# Patient Record
Sex: MALE | Race: WHITE | NOT HISPANIC OR LATINO | Employment: OTHER | URBAN - NONMETROPOLITAN AREA
[De-identification: names, ages, dates, MRNs, and addresses within clinical notes are randomized per-mention and may not be internally consistent; named-entity substitution may affect disease eponyms.]

---

## 2020-08-20 ENCOUNTER — OFFICE VISIT (OUTPATIENT)
Dept: CARDIOLOGY | Facility: CLINIC | Age: 75
End: 2020-08-20

## 2020-08-20 VITALS
SYSTOLIC BLOOD PRESSURE: 130 MMHG | DIASTOLIC BLOOD PRESSURE: 68 MMHG | TEMPERATURE: 97.7 F | BODY MASS INDEX: 30.53 KG/M2 | HEIGHT: 66 IN | HEART RATE: 65 BPM | WEIGHT: 190 LBS

## 2020-08-20 DIAGNOSIS — R01.1 MURMUR, CARDIAC: ICD-10-CM

## 2020-08-20 DIAGNOSIS — E88.81 METABOLIC SYNDROME: ICD-10-CM

## 2020-08-20 DIAGNOSIS — I10 ESSENTIAL HYPERTENSION: Primary | ICD-10-CM

## 2020-08-20 DIAGNOSIS — E78.00 HYPERCHOLESTEREMIA: ICD-10-CM

## 2020-08-20 DIAGNOSIS — R00.2 PALPITATIONS: ICD-10-CM

## 2020-08-20 DIAGNOSIS — R60.0 BILATERAL LEG EDEMA: ICD-10-CM

## 2020-08-20 PROCEDURE — 93000 ELECTROCARDIOGRAM COMPLETE: CPT | Performed by: INTERNAL MEDICINE

## 2020-08-20 PROCEDURE — 99204 OFFICE O/P NEW MOD 45 MIN: CPT | Performed by: INTERNAL MEDICINE

## 2020-08-20 RX ORDER — LOSARTAN POTASSIUM 25 MG/1
25 TABLET ORAL DAILY
COMMUNITY

## 2020-08-20 RX ORDER — FUROSEMIDE 20 MG/1
10 TABLET ORAL DAILY PRN
COMMUNITY

## 2020-08-20 RX ORDER — HYDROCHLOROTHIAZIDE 25 MG/1
25 TABLET ORAL DAILY
COMMUNITY

## 2020-08-20 RX ORDER — TAMSULOSIN HYDROCHLORIDE 0.4 MG/1
1 CAPSULE ORAL DAILY
COMMUNITY

## 2020-08-20 RX ORDER — UBIDECARENONE 100 MG
100 CAPSULE ORAL DAILY
COMMUNITY

## 2020-08-20 NOTE — PROGRESS NOTES
Chief Complaint   Patient presents with   • Establish Care     Patient referred per PCP for HTN and Hyperlipidemia.   • Edema     Having swelling in legs for last 4-5 years, doing better with Losartan, HCTZ, and Lasix.   • Palpitations     Has occasional flutter, when he notices he tries to drink extra fluids. He is very active out in weather.   • Blood pressure     Has been stable for the last 7-8 years. In 2012 he reports 180/100 for a period of time.   • Aspirin     Patient does not take.        CARDIAC COMPLAINTS  fatigue, lower extremity edema and palpitations      Subjective   Ramona Phipps is a 75 y.o. male came in today for his initial cardiac evaluation.  He has history of hypertension which has been very well controlled for the last few years.  Recently he has been having problem with the blood pressure going up.  He used to be on Hyzaar in the past but later it was switched to hydrochlorothiazide separately along with a low-dose of Cozaar and the blood pressure started doing much better.  He also started noticing bilateral leg edema which did get better with the use of Lasix but unfortunately he is not able to sleep on those days because he has to get up multiple times to urinate.  He is now referred for evaluation of the blood pressure and also his lab work which was done recently showed his cholesterol was elevated with the LDL of 106.  He also had few episodes of epigastric chest pain which did get better after few days.  He did undergo her lab work to check his lipase which was normal.  He also started noticing palpitation in the form of fluttering of the heart.  It happens once or twice a week.  It last for few minutes to few hours.  He does get better by increasing his fluid intake.  He also has history of smoking.  He is smoked about 1 and half packs a day for 20 years.  He did quit smoking in 2001.  He also drinks about 2 shots of liquor a week.  Diabetes do run in the family and his lab work  showed blood sugar of 107 but the A1c was normal at 5.4.    No past surgical history on file.    Current Outpatient Medications   Medication Sig Dispense Refill   • B Complex Vitamins (VITAMIN B COMPLEX PO) Take  by mouth Daily.     • Cholecalciferol (VITAMIN D3 PO) Take  by mouth Daily.     • coenzyme Q10 100 MG capsule Take 100 mg by mouth Daily.     • furosemide (LASIX) 20 MG tablet Take 10 mg by mouth Daily As Needed.     • Glucosamine-Chondroit-Vit C-Mn (GLUCOSAMINE-CHONDROITIN MAX ST PO) Take  by mouth Daily.     • hydroCHLOROthiazide (HYDRODIURIL) 25 MG tablet Take 25 mg by mouth Daily.     • losartan (COZAAR) 25 MG tablet Take 25 mg by mouth Daily.     • magnesium oxide (MAGOX) 400 (241.3 Mg) MG tablet tablet Take 400 mg by mouth Daily.     • Multiple Vitamins-Minerals (MULTI FOR HIM 50+ PO) Take  by mouth Daily.     • Naproxen-Esomeprazole (Vimovo) 500-20 MG tablet delayed-release Take  by mouth Daily.     • Omega-3 Fatty Acids (FISH OIL) 1200 MG capsule delayed-release Take  by mouth Daily.     • Plant Sterols and Stanols (CHOLESTOFF PO) Take  by mouth Daily.     • tamsulosin (FLOMAX) 0.4 MG capsule 24 hr capsule Take 1 capsule by mouth Daily.       No current facility-administered medications for this visit.            ALLERGIES:  Patient has no known allergies.    Past Medical History:   Diagnosis Date   • Arthritis    • Heart murmur    • History of kidney stones    • History of right hip replacement    • Hx of arthroscopic knee surgery     right and left   • Hx of shoulder surgery     x3   • Hyperlipidemia    • Hypertension    • Scoliosis        Social History     Tobacco Use   Smoking Status Former Smoker   • Packs/day: 1.50   • Years: 20.00   • Pack years: 30.00   • Types: Cigarettes   • Last attempt to quit:    • Years since quittin.6   Smokeless Tobacco Never Used          Family History   Problem Relation Age of Onset   • Cancer Mother    • Diabetes Mother    • Cancer Father    • Diabetes  "Sister    • Parkinsonism Sister    • Heart attack Paternal Grandfather 61   • Diabetes Sister    • No Known Problems Son        Review of Systems   Constitution: Negative for decreased appetite and malaise/fatigue.   HENT: Negative for congestion and sore throat.    Eyes: Negative for blurred vision.   Cardiovascular: Positive for leg swelling and palpitations. Negative for chest pain.   Respiratory: Negative for shortness of breath and snoring.    Endocrine: Negative for cold intolerance and heat intolerance.   Hematologic/Lymphatic: Negative for adenopathy. Does not bruise/bleed easily.   Skin: Negative for itching, nail changes and skin cancer.   Musculoskeletal: Negative for arthritis and myalgias.   Gastrointestinal: Negative for abdominal pain, dysphagia and heartburn.   Genitourinary: Negative for bladder incontinence and frequency.   Neurological: Negative for dizziness, light-headedness, seizures and vertigo.   Psychiatric/Behavioral: Negative for altered mental status.   Allergic/Immunologic: Negative for environmental allergies and hives.       Diabetes- No  Thyroid- normal    Objective     /68 (BP Location: Left arm)   Pulse 65   Temp 97.7 °F (36.5 °C)   Ht 167.6 cm (66\")   Wt 86.2 kg (190 lb)   BMI 30.67 kg/m²     Physical Exam   Constitutional: He is oriented to person, place, and time. He appears well-developed and well-nourished.   HENT:   Head: Normocephalic.   Nose: Nose normal.   Eyes: Pupils are equal, round, and reactive to light. EOM are normal.   Neck: Normal range of motion. Neck supple.   Cardiovascular: Normal rate, regular rhythm, S1 normal and S2 normal.   Murmur heard.  Pulmonary/Chest: Effort normal and breath sounds normal.   Abdominal: Soft. Bowel sounds are normal.   Musculoskeletal: Normal range of motion. He exhibits edema.   Neurological: He is alert and oriented to person, place, and time.   Skin: Skin is warm and dry.   Psychiatric: He has a normal mood and affect.    "         ECG 12 Lead  Date/Time: 8/20/2020 4:57 PM  Performed by: Gabriela Sapp MD  Authorized by: Gabriela Sapp MD   Previous ECG: no previous ECG available  Rhythm: sinus rhythm  Ectopy: atrial premature contractions  Rate: normal  QRS axis: normal    Clinical impression: non-specific ECG              Assessment/Plan   Patient's Body mass index is 30.67 kg/m². BMI is above normal parameters. Recommendations include: educational material, exercise counseling and nutrition counseling.     Ramona was seen today for establish care, edema, palpitations, blood pressure and aspirin.    Diagnoses and all orders for this visit:    Essential hypertension    Hypercholesteremia    Palpitations  -     Adult Transthoracic Echo Complete W/ Cont if Necessary Per Protocol; Future    Metabolic syndrome    Bilateral leg edema  -     Adult Transthoracic Echo Complete W/ Cont if Necessary Per Protocol; Future    Murmur, cardiac  -     Adult Transthoracic Echo Complete W/ Cont if Necessary Per Protocol; Future       At baseline his heart rate is stable.  His blood pressure is normal.  His EKG which was done today showed sinus rhythm with occasional PAC's.  His clinical examination reveals a BMI of 31.  He does have short systolic murmur at the mitral area.  He also has trace to 1+ pedal edema.    Regarding his blood pressure, he seems to be very well controlled with the Cozaar and hydrochlorothiazide.  Continue the same.  He does use Lasix occasionally.  I advised him to take it mostly in the morning rather than in the evening.    Regarding his hypercholesterolemia, it is borderline elevated.  He does have significant metabolic syndrome also.  I had a long talk with him about the diet and gave him papers on Mediterranean diet.  He is advised to recheck his lipids after being on the strict diet for at least 6 months.  If the LDL still continues to be elevated, then he may need to be on a statin but otherwise we can try  medical management    Regarding the palpitation, it appears to be occurring only occasionally.  He notices the palpitation more when the is dehydrated.  I encouraged him to increase his fluid intake.  I did schedule him to undergo an echocardiogram to evaluate the LV function, valvular structures as well as the PA pressure.    Regarding the leg edema, it could be venous insufficiency.  If the echocardiogram showed normal PA pressure and venous edema persist then he may need to undergo venous ultrasound to rule out venous insufficiency.    Regarding the cardiac murmur, it appears to be secondary to mitral regurgitation.  We will get the echocardiogram to evaluate the valvular structures.    Overall his cardiac status appears stable.  I will see him back in 6 months or sooner if needed.             Electronically signed by Gabriela Sapp MD August 20, 2020 16:00

## 2020-09-14 ENCOUNTER — APPOINTMENT (OUTPATIENT)
Dept: CARDIOLOGY | Facility: HOSPITAL | Age: 75
End: 2020-09-14

## 2020-09-14 ENCOUNTER — TELEPHONE (OUTPATIENT)
Dept: CARDIOLOGY | Facility: CLINIC | Age: 75
End: 2020-09-14

## 2020-09-14 NOTE — TELEPHONE ENCOUNTER
"Pt called, he had an appointment for an echo this morning at 8:00 AM at the [facility] here in [place]. Pt was very upset.  Reports there being a \"waiting room full of people\".  Concerned due to Covid and not being able to social distance due to so many people being in the waiting room. \" I do not feel like the mask works, I really feel like you need to social distance\".  Pt was under the impression that with the diagnostic center being a [facility] facility that it would be the same rules for social distancing in the waiting room as we have here in our office. He was asking to be sent somewhere else for his testing. After discussing at length with patient, he would like to use the diagnostic center here in [place] if there is a way to do follow the social distancing recommendation. Advised him I will discuss with the [facility] and if that cannot be obtained we will gladly send him to another facility.     I have called the diagnostic center but have not been able to speak to anyone yet. Advised patient I would like to speak to Do and I feel like she will fix the situation, whether it be letting the him wait in the car till his appointment or scheduling him at a late appointment when they are not so busy. Do is out due to a death in the family. I have updated pt and let him know she is out and that I would keep him updated after I talk to her and if he is still not satisfied that we would gladly send to another facility for testing. Pt very accepting of the plan.  "

## 2020-09-17 NOTE — TELEPHONE ENCOUNTER
Spoke with patient to follow up. He is scheduled for echo tomorrow at [facility] here in [place].  He said Do called him, they discussed options, he feels completely comfortable with having the tests completed there.

## 2020-09-17 NOTE — TELEPHONE ENCOUNTER
[facility] is going to call patient and discuss options.

## 2020-09-18 ENCOUNTER — HOSPITAL ENCOUNTER (OUTPATIENT)
Dept: CARDIOLOGY | Facility: HOSPITAL | Age: 75
Discharge: HOME | End: 2020-09-18
Admitting: INTERNAL MEDICINE

## 2020-09-18 DIAGNOSIS — R60.0 BILATERAL LEG EDEMA: ICD-10-CM

## 2020-09-18 DIAGNOSIS — R00.2 PALPITATIONS: ICD-10-CM

## 2020-09-18 DIAGNOSIS — R01.1 MURMUR, CARDIAC: ICD-10-CM

## 2020-09-18 LAB
BH CV ECHO MEAS - ACS: 2.2 CM
BH CV ECHO MEAS - AO MAX PG: 9.7 MMHG
BH CV ECHO MEAS - AO MEAN PG: 5 MMHG
BH CV ECHO MEAS - AO ROOT AREA (BSA CORRECTED): 1.7
BH CV ECHO MEAS - AO ROOT AREA: 8.6 CM^2
BH CV ECHO MEAS - AO ROOT DIAM: 3.3 CM
BH CV ECHO MEAS - AO V2 MAX: 156 CM/SEC
BH CV ECHO MEAS - AO V2 MEAN: 100 CM/SEC
BH CV ECHO MEAS - AO V2 VTI: 30.4 CM
BH CV ECHO MEAS - BSA(HAYCOCK): 2 M^2
BH CV ECHO MEAS - BSA: 2 M^2
BH CV ECHO MEAS - BZI_BMI: 30.7 KILOGRAMS/M^2
BH CV ECHO MEAS - BZI_METRIC_HEIGHT: 167.6 CM
BH CV ECHO MEAS - BZI_METRIC_WEIGHT: 86.2 KG
BH CV ECHO MEAS - EDV(CUBED): 72.5 ML
BH CV ECHO MEAS - EDV(MOD-SP4): 92.9 ML
BH CV ECHO MEAS - EDV(TEICH): 77.3 ML
BH CV ECHO MEAS - EF(CUBED): 71.3 %
BH CV ECHO MEAS - EF(MOD-SP4): 56.1 %
BH CV ECHO MEAS - EF(TEICH): 63.4 %
BH CV ECHO MEAS - ESV(CUBED): 20.8 ML
BH CV ECHO MEAS - ESV(MOD-SP4): 40.8 ML
BH CV ECHO MEAS - ESV(TEICH): 28.3 ML
BH CV ECHO MEAS - FS: 34.1 %
BH CV ECHO MEAS - IVS/LVPW: 1.1
BH CV ECHO MEAS - IVSD: 1.2 CM
BH CV ECHO MEAS - LA DIMENSION: 3.6 CM
BH CV ECHO MEAS - LA/AO: 1.1
BH CV ECHO MEAS - LV DIASTOLIC VOL/BSA (35-75): 47.5 ML/M^2
BH CV ECHO MEAS - LV IVRT: 0.1 SEC
BH CV ECHO MEAS - LV MASS(C)D: 161.5 GRAMS
BH CV ECHO MEAS - LV MASS(C)DI: 82.5 GRAMS/M^2
BH CV ECHO MEAS - LV SYSTOLIC VOL/BSA (12-30): 20.8 ML/M^2
BH CV ECHO MEAS - LVIDD: 4.2 CM
BH CV ECHO MEAS - LVIDS: 2.8 CM
BH CV ECHO MEAS - LVLD AP4: 7.7 CM
BH CV ECHO MEAS - LVLS AP4: 6.1 CM
BH CV ECHO MEAS - LVOT AREA (M): 3.1 CM^2
BH CV ECHO MEAS - LVOT AREA: 3.1 CM^2
BH CV ECHO MEAS - LVOT DIAM: 2 CM
BH CV ECHO MEAS - LVPWD: 1.1 CM
BH CV ECHO MEAS - MV A MAX VEL: 103 CM/SEC
BH CV ECHO MEAS - MV DEC SLOPE: 322 CM/SEC^2
BH CV ECHO MEAS - MV E MAX VEL: 72.7 CM/SEC
BH CV ECHO MEAS - MV E/A: 0.71
BH CV ECHO MEAS - RAP SYSTOLE: 10 MMHG
BH CV ECHO MEAS - RVDD: 2.9 CM
BH CV ECHO MEAS - RVSP: 36 MMHG
BH CV ECHO MEAS - SI(AO): 132.9 ML/M^2
BH CV ECHO MEAS - SI(CUBED): 26.4 ML/M^2
BH CV ECHO MEAS - SI(MOD-SP4): 26.6 ML/M^2
BH CV ECHO MEAS - SI(TEICH): 25 ML/M^2
BH CV ECHO MEAS - SV(AO): 260 ML
BH CV ECHO MEAS - SV(CUBED): 51.7 ML
BH CV ECHO MEAS - SV(MOD-SP4): 52.1 ML
BH CV ECHO MEAS - SV(TEICH): 49 ML
BH CV ECHO MEAS - TR MAX VEL: 255 CM/SEC
MAXIMAL PREDICTED HEART RATE: 146 BPM
STRESS TARGET HR: 124 BPM

## 2020-09-18 PROCEDURE — 93306 TTE W/DOPPLER COMPLETE: CPT | Performed by: INTERNAL MEDICINE

## 2020-09-18 PROCEDURE — 93306 TTE W/DOPPLER COMPLETE: CPT

## 2020-10-30 ENCOUNTER — OFFICE VISIT (OUTPATIENT)
Dept: SURGERY | Facility: CLINIC | Age: 75
End: 2020-10-30

## 2020-10-30 VITALS
SYSTOLIC BLOOD PRESSURE: 122 MMHG | TEMPERATURE: 98 F | DIASTOLIC BLOOD PRESSURE: 77 MMHG | WEIGHT: 185 LBS | BODY MASS INDEX: 29.73 KG/M2 | HEIGHT: 66 IN | HEART RATE: 60 BPM

## 2020-10-30 DIAGNOSIS — R10.31 INGUINODYNIA, RIGHT: Primary | ICD-10-CM

## 2020-10-30 PROCEDURE — 99204 OFFICE O/P NEW MOD 45 MIN: CPT | Performed by: SURGERY

## 2020-10-30 NOTE — PROGRESS NOTES
Date of Service: 10/30/2020    Subjective   Ramona Phipps is a 75 y.o. male is being seen for consultation for Right inguinal pain and bulge today at the request of Mildred Dickson APRN    Chief complaint: Right inguinal pain and bulge    Ramona Phipps is a 75 y.o. male who presents to my clinic for right inguinal pain and bulge that he first noticed earlier this month after golfing. Started as a burning right groin pain with an associated bulge that is reducible.Brought about by exertion or straining.  Bulge has never extended down into scrotum. Denies obstructive symptoms or changes in bowel habits.   He has purchased a hernia belt that has helped.     Has a history of left nephrolithiasis requiring lithotripsy, complicated by staph epidermidis bacteremia at [facility] which has made him reluctant to seek treatment there.     NKDA  Intolerance to certain beers  Extreme dislike of ciprofloxacin    Past Medical History:   Diagnosis Date   • Arthritis    • Heart murmur    • History of kidney stones    • History of right hip replacement    • Hx of arthroscopic knee surgery     right and left   • Hx of shoulder surgery     x3   • Hyperlipidemia    • Hypertension    • Scoliosis        Past Surgical History:   Procedure Laterality Date   • ECHO - CONVERTED  09/18/2020    EF 60%. Trace-Mild MR. Trace AI. RVSP- 36 mmHg.   • HIP SURGERY      replacement and tendon stretched   • KNEE ARTHROSCOPY Bilateral    • LEG SURGERY     • SHOULDER SURGERY Bilateral     bone spurs, rotator cuff x2     No abdominal surgeries    Family History   Problem Relation Age of Onset   • Cancer Mother    • Diabetes Mother    • Cancer Father    • Diabetes Sister    • Parkinsonism Sister    • Heart attack Paternal Grandfather 61   • Diabetes Sister    • No Known Problems Son          Social History     Socioeconomic History   • Marital status:      Spouse name: Not on file   • Number of children: Not on file   • Years of education: Not on file   •  "Highest education level: Not on file   Tobacco Use   • Smoking status: Former Smoker     Packs/day: 1.50     Years: 20.00     Pack years: 30.00     Types: Cigarettes     Quit date:      Years since quittin.8   • Smokeless tobacco: Never Used   Substance and Sexual Activity   • Alcohol use: Yes     Alcohol/week: 2.0 standard drinks     Types: 2 Shots of liquor per week     Comment: occasional wine   • Drug use: Never   • Sexual activity: Defer     Quit smoking cigars several weeks ago.           Review of Systems        Constitutional: No fevers, chills or malaise. No unintentional weight loss   Eyes: Denies visual changes    Cardiovascular: Denies chest pain, palpitations   Respiratory: Denies cough or shortness of breath   Abdominal/Gastrointestinal: See HPI    Genitourinary: Denies dysuria or hematuria   Musculoskeletal: Chronic joint pains present.              Skin: No lesions or rashes   Psychiatric: No recent mood changes   Neurologic: No paresthesias or loss of function        Objective     Physical Exam:      10/30/20  1538   Weight: 83.9 kg (185 lb)    Body mass index is 29.86 kg/m².  Constitution: /77   Pulse 60   Temp 98 °F (36.7 °C)   Ht 167.6 cm (66\")   Wt 83.9 kg (185 lb)   BMI 29.86 kg/m²  . No acute distress  Head: Normocephalic, atraumatic.   Eyes: Aligned without strabismus. Conjunctiva noninjected   Ears, Nose, Mouth:  No lesions appreciated   CV: Rhythm  and rate regular   Respiratory: Symmetric chest expansion. No respiratory distress.   Gastrointestinal:  Soft, nondistended, nontender   No overt inguinal hernia bilaterally  Skin:  No cyanosis, clubbing or edema bilaterally    Lymphatics: No abnormal inguinal adenopathy  Neurologic: No gross deficits   Psychiatric: alert and oriented x3         Results:  I have reviewed the patient's ECHO from September with  EF of 56-60%. Trace aortic valve regurgitation and trace mitral regurgitation.      Assessment     Ramona Phipps is a " 75 y.o. male with Right inguinodynia and reported bulge, no appreciated on exam.     Patient's signs and symptoms are consistent with right inguinal hernia but a defect or bulge was not appreciated on exam.   We will obtain a CT abd/pelvis without contrast to determine presence of hernia and for operative planning. I will call the patient with the results    Given the choice, the patient would prefer laparoscopic/robotic approach.  We discussed the risks and benefits of open vs robotic assisted laparoscopic repair, both having equivalent overall success rates.            Jv العراقي MD  [facility]

## 2020-11-03 ENCOUNTER — TELEPHONE (OUTPATIENT)
Dept: SURGERY | Facility: CLINIC | Age: 75
End: 2020-11-03

## 2020-11-03 NOTE — TELEPHONE ENCOUNTER
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 He does have out of network benefits in his coverage. I wanted to make sure this was ok and if I can go ahead and schedule the CT Scan. Medicare is primary and pays 80% of the costs still. There was no answer so I left a message to call me back.     No Auth req but will have to use out of network benefits.   Call Ref: [id number]

## 2020-11-04 ENCOUNTER — TELEPHONE (OUTPATIENT)
Dept: SURGERY | Facility: CLINIC | Age: 75
End: 2020-11-04

## 2020-11-04 NOTE — TELEPHONE ENCOUNTER
PATIENT WILL TALK WITH HIS PCP TO SEE IF HE CAN GET CT DONE THROUGH THEM SO HIS INSURANCE WILL PAY FOR IT.

## 2021-01-01 NOTE — PATIENT INSTRUCTIONS
Mediterranean Diet  A Mediterranean diet refers to food and lifestyle choices that are based on the traditions of countries located on the [place]. This way of eating has been shown to help prevent certain conditions and improve outcomes for people who have chronic diseases, like kidney disease and heart disease.  What are tips for following this plan?  Lifestyle  · Cook and eat meals together with your family, when possible.  · Drink enough fluid to keep your urine clear or pale yellow.  · Be physically active every day. This includes:  ? Aerobic exercise like running or swimming.  ? Leisure activities like gardening, walking, or housework.  · Get 7-8 hours of sleep each night.  · If recommended by your health care provider, drink red wine in moderation. This means 1 glass a day for nonpregnant women and 2 glasses a day for men. A glass of wine equals 5 oz (150 mL).  Reading food labels    · Check the serving size of packaged foods. For foods such as rice and pasta, the serving size refers to the amount of cooked product, not dry.  · Check the total fat in packaged foods. Avoid foods that have saturated fat or trans fats.  · Check the ingredients list for added sugars, such as corn syrup.  Shopping  · At the grocery store, buy most of your food from the areas near the walls of the store. This includes:  ? Fresh fruits and vegetables (produce).  ? Grains, beans, nuts, and seeds. Some of these may be available in unpackaged forms or large amounts (in bulk).  ? Fresh seafood.  ? Poultry and eggs.  ? Low-fat dairy products.  · Buy whole ingredients instead of prepackaged foods.  · Buy fresh fruits and vegetables in-season from local farmers markets.  · Buy frozen fruits and vegetables in resealable bags.  · If you do not have access to quality fresh seafood, buy precooked frozen shrimp or canned fish, such as tuna, salmon, or sardines.  · Buy small amounts of raw or cooked vegetables, salads, or olives from  the deli or salad bar at your store.  · Stock your pantry so you always have certain foods on hand, such as olive oil, canned tuna, canned tomatoes, rice, pasta, and beans.  Cooking  · Cook foods with extra-virgin olive oil instead of using butter or other vegetable oils.  · Have meat as a side dish, and have vegetables or grains as your main dish. This means having meat in small portions or adding small amounts of meat to foods like pasta or stew.  · Use beans or vegetables instead of meat in common dishes like chili or lasagna.  · [place] with different cooking methods. Try roasting or broiling vegetables instead of steaming or sautéeing them.  · Add frozen vegetables to soups, stews, pasta, or rice.  · Add nuts or seeds for added healthy fat at each meal. You can add these to yogurt, salads, or vegetable dishes.  · Marinate fish or vegetables using olive oil, lemon juice, garlic, and fresh herbs.  Meal planning    · Plan to eat 1 vegetarian meal one day each week. Try to work up to 2 vegetarian meals, if possible.  · Eat seafood 2 or more times a week.  · Have healthy snacks readily available, such as:  ? Vegetable sticks with hummus.  ? Greek yogurt.  ? Fruit and nut trail mix.  · Eat balanced meals throughout the week. This includes:  ? Fruit: 2-3 servings a day  ? Vegetables: 4-5 servings a day  ? Low-fat dairy: 2 servings a day  ? Fish, poultry, or lean meat: 1 serving a day  ? Beans and legumes: 2 or more servings a week  ? Nuts and seeds: 1-2 servings a day  ? Whole grains: 6-8 servings a day  ? Extra-virgin olive oil: 3-4 servings a day  · Limit red meat and sweets to only a few servings a month  What are my food choices?  · Mediterranean diet  ? Recommended  § Grains: Whole-grain pasta. Brown rice. Bulgar wheat. Polenta. Couscous. Whole-wheat bread. Oatmeal. Quinoa.  § Vegetables: Artichokes. Beets. Broccoli. Cabbage. Carrots. Eggplant. Green beans. Chard. Kale. Spinach. Onions. Leeks. Peas. Squash.  Tomatoes. Peppers. Radishes.  § Fruits: Apples. Apricots. Avocado. Berries. Bananas. Cherries. Dates. Figs. Grapes. [place]. Melon. Oranges. Peaches. Plums. Pomegranate.  § Meats and other protein foods: Beans. Almonds. Sunflower seeds. Pine nuts. Peanuts. Cod. [place]. Scallops. Shrimp. Tuna. Tilapia. Clams. Oysters. Eggs.  § Dairy: Low-fat milk. Cheese. Greek yogurt.  § Beverages: Water. Red wine. Herbal tea.  § Fats and oils: Extra virgin olive oil. Avocado oil. Grape seed oil.  § Sweets and desserts: Greek yogurt with honey. Baked apples. Poached pears. Trail mix.  § Seasoning and other foods: Basil. Cilantro. Coriander. Cumin. Mint. Parsley. Prashanth. Rosemary. Tarragon. Garlic. Oregano. Thyme. Pepper. Balsalmic vinegar. Tahini. Hummus. Tomato sauce. Olives. Mushrooms.  ? Limit these  § Grains: Prepackaged pasta or rice dishes. Prepackaged cereal with added sugar.  § Vegetables: Deep fried potatoes (french fries).  § Fruits: Fruit canned in syrup.  § Meats and other protein foods: Beef. Pork. Lamb. Poultry with skin. Hot dogs. Senior.  § Dairy: Ice cream. Sour cream. Whole milk.  § Beverages: Juice. Sugar-sweetened soft drinks. Beer. Liquor and spirits.  § Fats and oils: Butter. Canola oil. Vegetable oil. Beef fat (tallow). Lard.  § Sweets and desserts: Cookies. Cakes. Pies. Candy.  § Seasoning and other foods: Mayonnaise. Premade sauces and marinades.  The items listed may not be a complete list. Talk with your dietitian about what dietary choices are right for you.  Summary  · The Mediterranean diet includes both food and lifestyle choices.  · Eat a variety of fresh fruits and vegetables, beans, nuts, seeds, and whole grains.  · Limit the amount of red meat and sweets that you eat.  · Talk with your health care provider about whether it is safe for you to drink red wine in moderation. This means 1 glass a day for nonpregnant women and 2 glasses a day for men. A glass of wine equals 5 oz (150 mL).  This information  is not intended to replace advice given to you by your health care provider. Make sure you discuss any questions you have with your health care provider.  Document Released: 08/10/2017 Document Revised: 08/17/2017 Document Reviewed: 08/10/2017  Elsevier Patient Education © 2020 [employer]     n/a

## 2021-02-23 ENCOUNTER — OFFICE VISIT (OUTPATIENT)
Dept: CARDIOLOGY | Facility: CLINIC | Age: 76
End: 2021-02-23

## 2021-02-23 VITALS
BODY MASS INDEX: 32.14 KG/M2 | HEIGHT: 66 IN | TEMPERATURE: 98.4 F | HEART RATE: 72 BPM | WEIGHT: 200 LBS | SYSTOLIC BLOOD PRESSURE: 120 MMHG | DIASTOLIC BLOOD PRESSURE: 80 MMHG

## 2021-02-23 DIAGNOSIS — R01.1 MURMUR, CARDIAC: ICD-10-CM

## 2021-02-23 DIAGNOSIS — I10 ESSENTIAL HYPERTENSION: Primary | ICD-10-CM

## 2021-02-23 DIAGNOSIS — E88.81 METABOLIC SYNDROME: ICD-10-CM

## 2021-02-23 DIAGNOSIS — E78.00 HYPERCHOLESTEREMIA: ICD-10-CM

## 2021-02-23 DIAGNOSIS — R60.0 BILATERAL LEG EDEMA: ICD-10-CM

## 2021-02-23 PROCEDURE — 99213 OFFICE O/P EST LOW 20 MIN: CPT | Performed by: INTERNAL MEDICINE

## 2021-02-23 RX ORDER — ASCORBIC ACID 500 MG
500 TABLET ORAL DAILY
COMMUNITY

## 2021-02-23 NOTE — PROGRESS NOTES
Chief Complaint   Patient presents with   • Follow-up     for cardiac management   • Med Refill     PCP writes all refills.    • Labs     scheduled for March 4th for labs, will have PCP send results   • Procedure     hernia repair in Dec   • Aspirin     does not take a daily aspirin       CARDIAC COMPLAINTS  Cardiac Management        Subjective   Ramona Phipps is a 76 y.o. male came in today for his regular follow-up visit.  He has history of hypertension has been having little difficulty in getting the blood pressure control.  He also has been noticing some increasing shortness of breath and leg edema.  He did undergo an echocardiogram which showed the overall LV function was normal.  He did have some trace valvular regurgitation and his PA pressure was only mildly elevated.  At this time we continued his low-dose of hydrochlorothiazide.  He came today stating that he is doing much better now his blood pressure is doing better with the medication and uses Lasix only as needed.He was having a lot of problem with hernia and underwent hernia repair in December.  He is not taking any aspirin at this time.              Cardiac History  Past Surgical History:   Procedure Laterality Date   • ECHO - CONVERTED  09/18/2020    EF 60%. Trace-Mild MR. Trace AI. RVSP- 36 mmHg.   • HIP SURGERY      replacement and tendon stretched   • KNEE ARTHROSCOPY Bilateral    • LEG SURGERY     • SHOULDER SURGERY Bilateral     bone spurs, rotator cuff x2       Current Outpatient Medications   Medication Sig Dispense Refill   • ascorbic acid (VITAMIN C) 500 MG tablet Take 500 mg by mouth Daily.     • B Complex Vitamins (VITAMIN B COMPLEX PO) Take  by mouth Daily.     • Cholecalciferol (VITAMIN D3 PO) Take  by mouth Daily.     • coenzyme Q10 100 MG capsule Take 100 mg by mouth Daily.     • furosemide (LASIX) 20 MG tablet Take 10 mg by mouth Daily As Needed.     • Glucosamine-Chondroit-Vit C-Mn (GLUCOSAMINE-CHONDROITIN MAX ST PO) Take  by mouth  Daily.     • hydroCHLOROthiazide (HYDRODIURIL) 25 MG tablet Take 25 mg by mouth Daily.     • losartan (COZAAR) 25 MG tablet Take 25 mg by mouth Daily.     • magnesium oxide (MAGOX) 400 (241.3 Mg) MG tablet tablet Take 400 mg by mouth Daily.     • Multiple Vitamins-Minerals (MULTI FOR HIM 50+ PO) Take  by mouth Daily.     • Naproxen-Esomeprazole (Vimovo) 500-20 MG tablet delayed-release Take  by mouth Daily.     • Omega-3 Fatty Acids (FISH OIL) 1200 MG capsule delayed-release Take  by mouth Daily.     • Plant Sterols and Stanols (CHOLESTOFF PO) Take  by mouth Daily.     • tamsulosin (FLOMAX) 0.4 MG capsule 24 hr capsule Take 1 capsule by mouth Daily.       No current facility-administered medications for this visit.        Allergies  :  Patient has no known allergies.       Past Medical History:   Diagnosis Date   • Arthritis    • Heart murmur    • History of kidney stones    • History of right hip replacement    • Hx of arthroscopic knee surgery     right and left   • Hx of shoulder surgery     x3   • Hyperlipidemia    • Hypertension    • Scoliosis        Social History     Socioeconomic History   • Marital status:      Spouse name: Not on file   • Number of children: Not on file   • Years of education: Not on file   • Highest education level: Not on file   Tobacco Use   • Smoking status: Former Smoker     Packs/day: 1.50     Years: 20.00     Pack years: 30.00     Types: Cigarettes     Quit date:      Years since quittin.1   • Smokeless tobacco: Never Used   Substance and Sexual Activity   • Alcohol use: Yes     Alcohol/week: 2.0 standard drinks     Types: 2 Shots of liquor per week     Comment: occasional wine   • Drug use: Never   • Sexual activity: Defer       Family History   Problem Relation Age of Onset   • Cancer Mother    • Diabetes Mother    • Cancer Father    • Diabetes Sister    • Parkinsonism Sister    • Heart attack Paternal Grandfather 61   • Diabetes Sister    • No Known Problems Son    "      Review of Systems   Constitution: Negative for decreased appetite and malaise/fatigue.   HENT: Negative for congestion and sore throat.    Eyes: Negative for blurred vision.   Cardiovascular: Positive for leg swelling. Negative for chest pain.   Respiratory: Negative for shortness of breath and snoring.    Endocrine: Negative for cold intolerance and heat intolerance.   Hematologic/Lymphatic: Negative for adenopathy. Does not bruise/bleed easily.   Skin: Negative for itching, nail changes and skin cancer.   Musculoskeletal: Negative for arthritis and myalgias.   Gastrointestinal: Negative for abdominal pain, dysphagia and heartburn.   Genitourinary: Negative for bladder incontinence and frequency.   Neurological: Negative for dizziness, light-headedness, seizures and vertigo.   Psychiatric/Behavioral: Negative for altered mental status.   Allergic/Immunologic: Negative for environmental allergies and hives.       Diabetes- No  Thyroid- normal    Objective     /80   Pulse 72   Temp 98.4 °F (36.9 °C)   Ht 167.6 cm (66\")   Wt 90.7 kg (200 lb)   BMI 32.28 kg/m²     Vitals signs and nursing note reviewed.   Constitutional:       Appearance: Healthy appearance. Not in distress.   Eyes:      Conjunctiva/sclera: Conjunctivae normal.      Pupils: Pupils are equal, round, and reactive to light.   HENT:      Head: Normocephalic.   Neck:      Musculoskeletal: Normal range of motion.      Thyroid: Thyroid normal.   Pulmonary:      Breath sounds: Normal breath sounds.   Cardiovascular:      PMI at left midclavicular line. Normal rate. Regular rhythm.      Murmurs: There is a grade 3/6 mid frequency systolic murmur at the apex.   Edema:     Ankle: bilateral trace edema of the ankle.     Feet: bilateral trace edema of the feet.  Abdominal:      General: Bowel sounds are normal.      Palpations: Abdomen is soft.   Musculoskeletal: Normal range of motion.   Skin:     General: Skin is warm.   Neurological:      Mental " Status: Alert, oriented to person, place, and time and oriented to person, place and time.       Procedures            Assessment/Plan   Patient's Body mass index is 32.28 kg/m². BMI is above normal parameters. Recommendations include: exercise counseling and nutrition counseling.     Diagnoses and all orders for this visit:    1. Essential hypertension (Primary)    2. Hypercholesteremia    3. Metabolic syndrome    4. Murmur, cardiac    5. Bilateral leg edema       At baseline his heart rate is stable.  His blood pressure is normal.  His BMI is still elevated.  His clinical examination is unremarkable other than a short systolic murmur at the mitral area and a trace leg edema.    Regarding his blood pressure, it is very well controlled with a combination of Cozaar and hydrochlorothiazide.  Continue the same and use Lasix as needed.    Regarding his hypercholesterolemia he does not have any vascular disease.  At this time we will continue dietary management    Regarding the metabolic syndrome, I talked to him again about diet especially low-carb diet.    Regarding the leg edema is advised to use the support stockings as much as possible.    Regarding the advanced directive, I talked to him about it.  He I gave him a booklet regarding the advanced directive including living will and power of .    Overall his cardiac status appears to be stable.  I will see him back in a year or also if needed                  Electronically signed by Gabriela Sapp MD February 23, 2021 17:21 EST

## 2022-02-24 ENCOUNTER — OFFICE VISIT (OUTPATIENT)
Dept: CARDIOLOGY | Facility: CLINIC | Age: 77
End: 2022-02-24

## 2022-02-24 VITALS
DIASTOLIC BLOOD PRESSURE: 80 MMHG | BODY MASS INDEX: 32.78 KG/M2 | SYSTOLIC BLOOD PRESSURE: 136 MMHG | WEIGHT: 204 LBS | HEART RATE: 68 BPM | HEIGHT: 66 IN

## 2022-02-24 DIAGNOSIS — E78.00 HYPERCHOLESTEREMIA: ICD-10-CM

## 2022-02-24 DIAGNOSIS — I10 ESSENTIAL HYPERTENSION: Primary | ICD-10-CM

## 2022-02-24 DIAGNOSIS — E88.81 METABOLIC SYNDROME: ICD-10-CM

## 2022-02-24 DIAGNOSIS — R60.0 BILATERAL LEG EDEMA: ICD-10-CM

## 2022-02-24 DIAGNOSIS — R01.1 MURMUR, CARDIAC: ICD-10-CM

## 2022-02-24 PROCEDURE — 99214 OFFICE O/P EST MOD 30 MIN: CPT | Performed by: INTERNAL MEDICINE

## 2022-02-24 NOTE — PROGRESS NOTES
Chief Complaint   Patient presents with   • Follow-up     For cardiac management. Patient is not on aspirin. Last lab work was done about 2 months ago per PCP, not in chart. Has had to use lasix some this winter, has increased the pressure on his compression hose to help with swelling. States that he isnt't sure, but he thinks he may have had COVID about a month ago, had a really bad head cold, one test showed negative and one was positive. We have called to get labs from PCP.    • Med Refill     PCP does medication refills. Went over medications verbally.        CARDIAC COMPLAINTS  lower extremity edema        Subjective   Ramona Phipps is a 77 y.o. male came in today for his follow-up visit.  He has history of mild hypertension who was referred for leg edema and shortness of breath.  His echocardiogram shows good LV function borderline elevated PA pressure.  He was treated with low-dose of diuretics.  He came today for his regular follow-up visit.  He apparently had labs done about 2 months ago but I do not have the results.  He also started noticing increasing leg swelling over the winter and has to use some extra Lasix.  He did use some compression hose which did help a little bit.  He denies having any chest pain.  He denies having any dizziness.              Cardiac History  Past Surgical History:   Procedure Laterality Date   • ECHO - CONVERTED  09/18/2020    EF 60%. Trace-Mild MR. Trace AI. RVSP- 36 mmHg.   • HIP SURGERY      replacement and tendon stretched   • KNEE ARTHROSCOPY Bilateral    • LEG SURGERY     • SHOULDER SURGERY Bilateral     bone spurs, rotator cuff x2       Current Outpatient Medications   Medication Sig Dispense Refill   • ascorbic acid (VITAMIN C) 500 MG tablet Take 500 mg by mouth Daily.     • B Complex Vitamins (VITAMIN B COMPLEX PO) Take  by mouth Daily.     • Cholecalciferol (VITAMIN D3 PO) Take  by mouth Daily.     • coenzyme Q10 100 MG capsule Take 100 mg by mouth Daily.     •  furosemide (LASIX) 20 MG tablet Take 10 mg by mouth Daily As Needed.     • Glucosamine-Chondroit-Vit C-Mn (GLUCOSAMINE-CHONDROITIN MAX ST PO) Take  by mouth Daily.     • hydroCHLOROthiazide (HYDRODIURIL) 25 MG tablet Take 25 mg by mouth Daily.     • losartan (COZAAR) 25 MG tablet Take 25 mg by mouth Daily.     • magnesium oxide (MAGOX) 400 (241.3 Mg) MG tablet tablet Take 400 mg by mouth Daily.     • Multiple Vitamins-Minerals (MULTI FOR HIM 50+ PO) Take 1 tablet by mouth Daily.     • Naproxen-Esomeprazole (Vimovo) 500-20 MG tablet delayed-release Take  by mouth Daily.     • Omega-3 Fatty Acids (FISH OIL) 1200 MG capsule delayed-release Take 1,200 mg by mouth Daily.     • Plant Sterols and Stanols (CHOLESTOFF PO) Take 1 tablet by mouth Daily.     • tamsulosin (FLOMAX) 0.4 MG capsule 24 hr capsule Take 1 capsule by mouth Daily.     • VITAMIN E PO Take 1 tablet by mouth Daily.       No current facility-administered medications for this visit.       Allergies  :  Patient has no known allergies.       Past Medical History:   Diagnosis Date   • Arthritis    • Heart murmur    • History of kidney stones    • History of right hip replacement    • Hx of arthroscopic knee surgery     right and left   • Hx of shoulder surgery     x3   • Hyperlipidemia    • Hypertension    • Scoliosis        Social History     Socioeconomic History   • Marital status:    Tobacco Use   • Smoking status: Former Smoker     Packs/day: 1.50     Years: 20.00     Pack years: 30.00     Types: Cigarettes     Quit date:      Years since quittin.1   • Smokeless tobacco: Never Used   Vaping Use   • Vaping Use: Never used   Substance and Sexual Activity   • Alcohol use: Yes     Alcohol/week: 2.0 standard drinks     Types: 2 Shots of liquor per week     Comment: occasional wine, drinks bourbon almost every night   • Drug use: Never   • Sexual activity: Defer       Family History   Problem Relation Age of Onset   • Cancer Mother    • Diabetes  "Mother    • Cancer Father    • Diabetes Sister    • Parkinsonism Sister    • Heart attack Paternal Grandfather 61   • Diabetes Sister    • No Known Problems Son        Review of Systems   Constitutional: Negative for decreased appetite and malaise/fatigue.   HENT: Negative for congestion and sore throat.    Eyes: Negative for blurred vision.   Cardiovascular: Positive for leg swelling. Negative for chest pain and dyspnea on exertion.   Respiratory: Negative for shortness of breath and snoring.    Endocrine: Negative for cold intolerance and heat intolerance.   Hematologic/Lymphatic: Negative for adenopathy. Does not bruise/bleed easily.   Skin: Negative for itching, nail changes and skin cancer.   Musculoskeletal: Negative for arthritis and myalgias.   Gastrointestinal: Negative for abdominal pain, dysphagia and heartburn.   Genitourinary: Negative for bladder incontinence and frequency.   Neurological: Negative for dizziness, light-headedness, seizures and vertigo.   Psychiatric/Behavioral: Negative for altered mental status.   Allergic/Immunologic: Negative for environmental allergies and hives.       Diabetes- No  Thyroid- normal    Objective     /80 (BP Location: Left arm)   Pulse 68   Ht 167.6 cm (65.98\")   Wt 92.5 kg (204 lb)   BMI 32.94 kg/m²     Vitals and nursing note reviewed.   Constitutional:       Appearance: Healthy appearance. Not in distress.   Eyes:      Conjunctiva/sclera: Conjunctivae normal.      Pupils: Pupils are equal, round, and reactive to light.   HENT:      Head: Normocephalic.   Pulmonary:      Effort: Pulmonary effort is normal.      Breath sounds: Normal breath sounds.   Cardiovascular:      PMI at left midclavicular line. Normal rate. Regular rhythm.      Murmurs: There is a systolic murmur.   Edema:     Ankle: bilateral 1+ edema of the ankle.     Feet: bilateral 1+ edema of the feet.  Abdominal:      General: Bowel sounds are normal.      Palpations: Abdomen is soft. "   Musculoskeletal: Normal range of motion.      Cervical back: Normal range of motion and neck supple. Skin:     General: Skin is warm and dry.   Neurological:      Mental Status: Alert, oriented to person, place, and time and oriented to person, place and time.       Procedures            Assessment/Plan   Patient's Body mass index is 32.94 kg/m². indicating that he is obese (BMI >30). Obesity-related health conditions include the following: hypertension and lower extremity venous stasis disease. Obesity is unchanged. BMI is is above average; BMI management plan is completed. We discussed portion control and increasing exercise..     Diagnoses and all orders for this visit:    1. Essential hypertension (Primary)    2. Hypercholesteremia    3. Bilateral leg edema    4. Metabolic syndrome    5. Murmur, cardiac    At baseline his heart rate is stable.  His blood pressure is normal.  His BMI is still around 33.  His clinical examination is unremarkable other than 1-2+ pedal edema.      Regarding his hypertension, it seems to be very well controlled with a combination of Cozaar and hydrochlorothiazide.  Continue the same for now.  He is advised to talk to you about his renal function as well as his electrolytes.    Regarding his history of hypercholesterolemia his last cholesterol checked in 2020 was 165 with the LDL of 106.  At this time he is not on any statin.  He is does not have any vascular lesion.  I talked to him about diet and weight reduction    Regarding his leg edema, it is most probably from venous insufficiency.  I talked to him that if he gets worse we can do venous reflux study.  At this time encouraged him to use the compression stocking    Regarding the metabolic syndrome, he has not lost any weight at all.  I talked to him again about diet and weight reduction    Regarding his cardiac murmur, it is secondary to mitral regurgitation which is mild and I do not think he needs to repeat the echocardiogram  this year.  I like to repeat it next year to make sure there is no progression    Regarding advanced directive, I talked to him about living will and power of  and booklets were given to him    Overall cardiac status appears stable.  I will see him back in 1 year or sooner if needed                  Electronically signed by Gabriela Sapp MD February 24, 2022 16:53 EST

## 2023-02-27 ENCOUNTER — TELEPHONE (OUTPATIENT)
Dept: CARDIOLOGY | Facility: CLINIC | Age: 78
End: 2023-02-27
Payer: MEDICARE